# Patient Record
Sex: FEMALE | Race: WHITE | URBAN - METROPOLITAN AREA
[De-identification: names, ages, dates, MRNs, and addresses within clinical notes are randomized per-mention and may not be internally consistent; named-entity substitution may affect disease eponyms.]

---

## 2019-03-05 ENCOUNTER — TELEPHONE (OUTPATIENT)
Dept: PLASTIC SURGERY | Facility: CLINIC | Age: 29
End: 2019-03-05

## 2019-03-05 NOTE — TELEPHONE ENCOUNTER
[facility] Call [facility]    Phone Message    May a detailed message be left on voicemail: yes    Reason for Call: Other: New patient submitted an online appointment request. Patient would like to see Dr. Vogel for phalloplasty surgery.     Action Taken: Message routed to:  [facility] ([facility]): Plastic Surgery- gender care program

## 2019-03-08 NOTE — TELEPHONE ENCOUNTER
Called pt, LVM, details on how to schedule lower surgery consult was provided along with Fax # for 2 letters of support.